# Patient Record
Sex: FEMALE
[De-identification: names, ages, dates, MRNs, and addresses within clinical notes are randomized per-mention and may not be internally consistent; named-entity substitution may affect disease eponyms.]

---

## 2017-05-05 ENCOUNTER — HOSPITAL ENCOUNTER (OUTPATIENT)
Dept: HOSPITAL 45 - C.LABMFLN | Age: 37
Discharge: HOME | End: 2017-05-05
Attending: PHYSICIAN ASSISTANT
Payer: COMMERCIAL

## 2017-05-05 DIAGNOSIS — Z02.1: Primary | ICD-10-CM

## 2017-05-05 LAB
ALBUMIN/GLOB SERPL: 0.8 {RATIO} (ref 0.9–2)
ALP SERPL-CCNC: 91 U/L (ref 45–117)
ALT SERPL-CCNC: 16 U/L (ref 12–78)
ANION GAP SERPL CALC-SCNC: 6 MMOL/L (ref 3–11)
AST SERPL-CCNC: 11 U/L (ref 15–37)
BASOPHILS # BLD: 0.01 K/UL (ref 0–0.2)
BASOPHILS NFR BLD: 0.1 %
BUN SERPL-MCNC: 13 MG/DL (ref 7–18)
BUN/CREAT SERPL: 18.4 (ref 10–20)
CALCIUM SERPL-MCNC: 9.3 MG/DL (ref 8.5–10.1)
CHLORIDE SERPL-SCNC: 106 MMOL/L (ref 98–107)
CHOLEST/HDLC SERPL: 3 {RATIO}
CO2 SERPL-SCNC: 28 MMOL/L (ref 21–32)
COMPLETE: YES
CREAT SERPL-MCNC: 0.7 MG/DL (ref 0.6–1.2)
EOSINOPHIL NFR BLD AUTO: 394 K/UL (ref 130–400)
FERRITIN SERPL-MCNC: 9.1 NG/ML (ref 8–388)
GLOBULIN SER-MCNC: 3.8 GM/DL (ref 2.5–4)
GLUCOSE SERPL-MCNC: 81 MG/DL (ref 70–99)
GLUCOSE UR QL: 66 MG/DL
HCT VFR BLD CALC: 37.3 % (ref 37–47)
IG%: 0.1 %
IMM GRANULOCYTES NFR BLD AUTO: 35.5 %
KETONES UR QL STRIP: 116 MG/DL
LYMPHOCYTES # BLD: 2.81 K/UL (ref 1.2–3.4)
MCH RBC QN AUTO: 27.5 PG (ref 25–34)
MCHC RBC AUTO-ENTMCNC: 32.7 G/DL (ref 32–36)
MCV RBC AUTO: 84.2 FL (ref 80–100)
MONOCYTES NFR BLD: 9.1 %
NEUTROPHILS # BLD AUTO: 1 %
NEUTROPHILS NFR BLD AUTO: 54.2 %
NITRITE UR QL STRIP: 78 MG/DL (ref 0–150)
PH UR: 198 MG/DL (ref 0–200)
PMV BLD AUTO: 10.1 FL (ref 7.4–10.4)
POTASSIUM SERPL-SCNC: 3.6 MMOL/L (ref 3.5–5.1)
RBC # BLD AUTO: 4.43 M/UL (ref 4.2–5.4)
RUBELLA SCREEN IGG (AT CCH): (no result)
SODIUM SERPL-SCNC: 140 MMOL/L (ref 136–145)
TIBC SERPL-MCNC: 414 MCG/DL (ref 250–450)
VERY LOW DENSITY LIPOPROT CALC: 16 MG/DL
WBC # BLD AUTO: 7.92 K/UL (ref 4.8–10.8)

## 2017-07-21 ENCOUNTER — HOSPITAL ENCOUNTER (OUTPATIENT)
Dept: HOSPITAL 45 - C.LABMFLN | Age: 37
Discharge: HOME | End: 2017-07-21
Attending: FAMILY MEDICINE
Payer: COMMERCIAL

## 2017-07-21 DIAGNOSIS — J02.9: Primary | ICD-10-CM

## 2017-09-15 ENCOUNTER — HOSPITAL ENCOUNTER (OUTPATIENT)
Dept: HOSPITAL 45 - C.LABSPEC | Age: 37
Discharge: HOME | End: 2017-09-15
Attending: OBSTETRICS & GYNECOLOGY
Payer: COMMERCIAL

## 2017-09-15 DIAGNOSIS — O09.521: Primary | ICD-10-CM

## 2017-09-15 LAB
APPEARANCE UR: CLEAR
BILIRUB UR-MCNC: (no result) MG/DL
COLOR UR: YELLOW
MANUAL MICROSCOPIC REQUIRED?: NO
NITRITE UR QL STRIP: (no result)
PH UR STRIP: 6.5 [PH] (ref 4.5–7.5)
REVIEW REQ?: NO
SP GR UR STRIP: 1.02 (ref 1–1.03)
URINE BILL WITH OR WITHOUT MIC: (no result)
UROBILINOGEN UR-MCNC: (no result) MG/DL

## 2017-09-22 ENCOUNTER — HOSPITAL ENCOUNTER (OUTPATIENT)
Dept: HOSPITAL 45 - C.LAB1850 | Age: 37
Discharge: HOME | End: 2017-09-22
Attending: OBSTETRICS & GYNECOLOGY
Payer: COMMERCIAL

## 2017-09-22 ENCOUNTER — HOSPITAL ENCOUNTER (OUTPATIENT)
Dept: HOSPITAL 45 - C.PAPS | Age: 37
Discharge: HOME | End: 2017-09-22
Attending: OBSTETRICS & GYNECOLOGY
Payer: COMMERCIAL

## 2017-09-22 ENCOUNTER — HOSPITAL ENCOUNTER (OUTPATIENT)
Dept: HOSPITAL 45 - C.LABSPEC | Age: 37
Discharge: HOME | End: 2017-09-22
Attending: OBSTETRICS & GYNECOLOGY
Payer: COMMERCIAL

## 2017-09-22 DIAGNOSIS — Z01.419: Primary | ICD-10-CM

## 2017-09-22 DIAGNOSIS — O09.521: ICD-10-CM

## 2017-09-22 DIAGNOSIS — O09.521: Primary | ICD-10-CM

## 2017-09-22 LAB
BASOPHILS # BLD: 0.02 K/UL (ref 0–0.2)
BASOPHILS NFR BLD: 0.2 %
COMPLETE: YES
EOSINOPHIL NFR BLD AUTO: 433 K/UL (ref 130–400)
HCT VFR BLD CALC: 37.3 % (ref 37–47)
IG%: 0.2 %
IMM GRANULOCYTES NFR BLD AUTO: 27.9 %
LYMPHOCYTES # BLD: 2.42 K/UL (ref 1.2–3.4)
MCH RBC QN AUTO: 27.7 PG (ref 25–34)
MCHC RBC AUTO-ENTMCNC: 33.8 G/DL (ref 32–36)
MCV RBC AUTO: 82 FL (ref 80–100)
MONOCYTES NFR BLD: 7.7 %
NEUTROPHILS # BLD AUTO: 0.6 %
NEUTROPHILS NFR BLD AUTO: 63.4 %
PMV BLD AUTO: 10.7 FL (ref 7.4–10.4)
RBC # BLD AUTO: 4.55 M/UL (ref 4.2–5.4)
WBC # BLD AUTO: 8.68 K/UL (ref 4.8–10.8)

## 2017-09-25 LAB
CHLAMYDIA TRACH RNA***: NOT DETECTED
GC (NEIS GONORRHOEAE)RNA**: NOT DETECTED

## 2017-11-17 ENCOUNTER — HOSPITAL ENCOUNTER (OUTPATIENT)
Dept: HOSPITAL 45 - C.LAB1850 | Age: 37
Discharge: HOME | End: 2017-11-17
Attending: OBSTETRICS & GYNECOLOGY
Payer: COMMERCIAL

## 2017-11-17 DIAGNOSIS — O09.522: Primary | ICD-10-CM

## 2017-11-17 LAB — GTGD: 50 GRAMS

## 2017-11-22 LAB
AFP CONCENTRATION: 22.3 NG/ML
AFP MSS INTERPRETATION: (no result)
AFP MULTIPLE OF MEDIAN: 0.91
AFPTS COMMENT: (no result)
AFPTS GESTATIONAL AGE: 16.7 WEEKS
AFPTS INSULIN DEP DIABETIC?: NO
AFPTS MATERNAL WT: 281 LBS
ALPHA-FETOPROTEIN RACE: (no result)
CIGARETTE SMOKER?: (no result)
EDD DETERMINED BY: (no result)
HISTORY OF NTD: NO
REPEAT SAMPLE?: NO

## 2018-02-09 ENCOUNTER — HOSPITAL ENCOUNTER (OUTPATIENT)
Dept: HOSPITAL 45 - C.LAB1850 | Age: 38
Discharge: HOME | End: 2018-02-09
Attending: OBSTETRICS & GYNECOLOGY
Payer: COMMERCIAL

## 2018-02-09 DIAGNOSIS — O09.523: Primary | ICD-10-CM

## 2018-02-09 LAB
HCT VFR BLD CALC: 32.6 % (ref 37–47)
HGB BLD-MCNC: 10.8 G/DL (ref 12–16)

## 2018-03-01 ENCOUNTER — HOSPITAL ENCOUNTER (EMERGENCY)
Dept: HOSPITAL 45 - C.EDB | Age: 38
Discharge: HOME | End: 2018-03-01
Payer: COMMERCIAL

## 2018-03-01 ENCOUNTER — HOSPITAL ENCOUNTER (OUTPATIENT)
Dept: HOSPITAL 45 - C.LD | Age: 38
Setting detail: OBSERVATION
LOS: 1 days | Discharge: HOME | End: 2018-03-02
Attending: OBSTETRICS & GYNECOLOGY | Admitting: OBSTETRICS & GYNECOLOGY
Payer: COMMERCIAL

## 2018-03-01 VITALS — SYSTOLIC BLOOD PRESSURE: 177 MMHG | OXYGEN SATURATION: 98 % | HEART RATE: 106 BPM | DIASTOLIC BLOOD PRESSURE: 117 MMHG

## 2018-03-01 VITALS
BODY MASS INDEX: 55.32 KG/M2 | HEIGHT: 60.98 IN | WEIGHT: 293 LBS | HEIGHT: 60.98 IN | WEIGHT: 293 LBS | BODY MASS INDEX: 55.32 KG/M2

## 2018-03-01 VITALS
BODY MASS INDEX: 55.32 KG/M2 | HEIGHT: 60.98 IN | WEIGHT: 293 LBS | BODY MASS INDEX: 55.32 KG/M2 | WEIGHT: 293 LBS | HEIGHT: 60.98 IN

## 2018-03-01 VITALS — TEMPERATURE: 98.6 F

## 2018-03-01 DIAGNOSIS — O99.213: ICD-10-CM

## 2018-03-01 DIAGNOSIS — E66.01: ICD-10-CM

## 2018-03-01 DIAGNOSIS — R19.7: ICD-10-CM

## 2018-03-01 DIAGNOSIS — O09.523: ICD-10-CM

## 2018-03-01 DIAGNOSIS — R11.2: Primary | ICD-10-CM

## 2018-03-01 DIAGNOSIS — Z3A.31: ICD-10-CM

## 2018-03-01 DIAGNOSIS — O09.523: Primary | ICD-10-CM

## 2018-03-01 DIAGNOSIS — K52.9: ICD-10-CM

## 2018-03-01 LAB
ALBUMIN SERPL-MCNC: 2.5 GM/DL (ref 3.4–5)
ALP SERPL-CCNC: 114 U/L (ref 45–117)
ALT SERPL-CCNC: 13 U/L (ref 12–78)
AST SERPL-CCNC: 15 U/L (ref 15–37)
BASOPHILS # BLD: 0 K/UL (ref 0–0.2)
BASOPHILS NFR BLD: 0 %
BUN SERPL-MCNC: 10 MG/DL (ref 7–18)
CALCIUM SERPL-MCNC: 8.9 MG/DL (ref 8.5–10.1)
CO2 SERPL-SCNC: 21 MMOL/L (ref 21–32)
CREAT SERPL-MCNC: 0.51 MG/DL (ref 0.6–1.2)
EOS ABS #: 0.01 K/UL (ref 0–0.5)
EOSINOPHIL NFR BLD AUTO: 410 K/UL (ref 130–400)
GLUCOSE SERPL-MCNC: 90 MG/DL (ref 70–99)
HCT VFR BLD CALC: 34 % (ref 37–47)
HGB BLD-MCNC: 11.4 G/DL (ref 12–16)
IG#: 0.08 K/UL (ref 0–0.02)
IMM GRANULOCYTES NFR BLD AUTO: 8.8 %
LIPASE: 128 U/L (ref 73–393)
LYMPHOCYTES # BLD: 1.53 K/UL (ref 1.2–3.4)
MCH RBC QN AUTO: 27.3 PG (ref 25–34)
MCHC RBC AUTO-ENTMCNC: 33.5 G/DL (ref 32–36)
MCV RBC AUTO: 81.5 FL (ref 80–100)
MONO ABS #: 0.99 K/UL (ref 0.11–0.59)
MONOCYTES NFR BLD: 5.7 %
NEUT ABS #: 14.7 K/UL (ref 1.4–6.5)
NEUTROPHILS # BLD AUTO: 0.1 %
NEUTROPHILS NFR BLD AUTO: 84.9 %
PMV BLD AUTO: 10 FL (ref 7.4–10.4)
POTASSIUM SERPL-SCNC: 3.2 MMOL/L (ref 3.5–5.1)
PROT SERPL-MCNC: 6.9 GM/DL (ref 6.4–8.2)
RED CELL DISTRIBUTION WIDTH CV: 13.8 % (ref 11.5–14.5)
RED CELL DISTRIBUTION WIDTH SD: 40.7 FL (ref 36.4–46.3)
SODIUM SERPL-SCNC: 137 MMOL/L (ref 136–145)
URATE SERPL-MCNC: 3.7 MG/DL (ref 2.6–7.2)
WBC # BLD AUTO: 17.31 K/UL (ref 4.8–10.8)

## 2018-03-01 RX ADMIN — SODIUM CHLORIDE AND POTASSIUM CHLORIDE SCH MLS/HR: 9; 1.49 INJECTION, SOLUTION INTRAVENOUS at 18:46

## 2018-03-01 RX ADMIN — SODIUM CHLORIDE AND POTASSIUM CHLORIDE SCH MLS/HR: 9; 1.49 INJECTION, SOLUTION INTRAVENOUS at 23:11

## 2018-03-01 NOTE — EMERGENCY ROOM VISIT NOTE
History


First contact with patient:  15:18


Chief Complaint:  VOMITING


Stated Complaint:  SUDDEN NIVID,31 WEEKS PREGNANT WITH CONTRACTIONS


Nursing Triage Summary:  


HAS BEEN NAUSEATED SINCE NOT EATING BUT WHEN SHE EATS EMESIS





History of Present Illness


The patient is a 37 year old female who presents to the Emergency Room via 

private vehicle accompanied by female with complaints of "sudden onset nausea, 

vomiting, diarrhea around 11:30 AM".  The patient states that she is 31 weeks 

pregnant and also has been experiencing contractions with this.  She states she 

also has some epigastric abdominal pain.  The gallbladder is surgically absent.

  She states that she has been trying to tolerate p.o. fluids and food without 

success.  She has had watery diarrhea.  There is been 8-9 episodes and has been 

light brown in nature.  She took Tylenol this morning around 8 or 9 AM.  She 

has associated chills, headache and sharp abdominal pain inferiorly with crampy 

sensation inferiorly that is progressing and lasting about 15 seconds in 

duration.  She denies any fevers, melena, hematochezia, hematemesis, vaginal 

bleeding, vaginal discharge, urinary symptoms.  She denies any decrease in 

fetal activity.  She has a history of ventral hernia and pernicious anemia.





Review of Systems


A complete 10-point Review of Systems was discussed with the patient, with 

pertinent positives and negatives listed in the History of Present Illness. All 

remaining Review of Systems questions can be considered negative unless 

otherwise specified.





Past Medical/Surgical History


Medical Problems:


(1) 








Current/Historical Medications


Scheduled


Cyanocobalamin (Cyanocobalamin), 1,000 MCG IM Mo


Hydroxyprogesterone Caproate ([place]), 750 MG IM WK


Iron-Vitamin C (Vitron-C), 1 TAB PO DAILY


Multivit/Min/Iron/Fol Ac/Pren (Prenatal Vitamin), 1 TAB PO DAILY





Physical Exam


Vital Signs











  Date Time  Temp Pulse Resp B/P (MAP) Pulse Ox O2 Delivery O2 Flow Rate FiO2


 


3/1/18 17:03     98 Room Air  


 


3/1/18 17:03  106 16 177/117 98 Room Air  


 


3/1/18 15:02 37.0 135 22 156/102 99   











Physical Exam


VITAL SIGNS - Vital signs and nursing notes were reviewed.  Hypertensive and 

tachycardic upon arrival.  She is afebrile.


GENERAL -37-year-old female appearing her stated age who is in no acute 

distress. Communicates well with provider and answers questions appropriately.


SKIN - Without rashes.  No petechial rashes.


HEAD - NC/AT.


EYES - Sclera anicteric. 


EARS - No deformities of external structures noted on gross examination 

bilaterally. 


NOSE - Midline and without cyanosis. No epistaxis or purulent drainage noted. 


MOUTH/OROPHARYNX - Without perioral cyanosis. 


NECK - Neck with FROM. 


LUNGS - Chest wall symmetric without accessory muscle use, intercostals 

retractions, or central cyanosis. Normal vesicular breath sounds CTA B/L. No 

wheezes, rales, or rhonchi appreciated.


CARDIAC - RRR with S1/S2. No murmur, rubs, or gallops appreciated. 


ABDOMEN - Abdominal contour with evidence of gestation without pulsations or 

visible masses. BS normoactive all four quadrants. Epigastric abd pain noted.No 

unusual palpable masses, hepatosplenomegaly, or ascites noted.


EXTREMITIES - No clubbing or peripheral cyanosis. No pretibial edema present.


NEUROLOGIC - Cranial nerves II through XII grossly intact.





Medical Decision & Procedures


Laboratory Results


3/1/18 15:55








Red Blood Count 4.17, Mean Corpuscular Volume 81.5, Mean Corpuscular Hemoglobin 

27.3, Mean Corpuscular Hemoglobin Concent 33.5, Mean Platelet Volume 10.0, 

Neutrophils (%) (Auto) 84.9, Lymphocytes (%) (Auto) 8.8, Monocytes (%) (Auto) 

5.7, Eosinophils (%) (Auto) 0.1, Basophils (%) (Auto) 0.0, Neutrophils # (Auto) 

14.70, Lymphocytes # (Auto) 1.53, Monocytes # (Auto) 0.99, Eosinophils # (Auto) 

0.01, Basophils # (Auto) 0.00





3/1/18 15:55

















Test


  3/1/18


15:55 3/1/18


17:20


 


White Blood Count


  17.31 K/uL


(4.8-10.8) 


 


 


Red Blood Count


  4.17 M/uL


(4.2-5.4) 


 


 


Hemoglobin


  11.4 g/dL


(12.0-16.0) 


 


 


Hematocrit 34.0 % (37-47)  


 


Mean Corpuscular Volume


  81.5 fL


() 


 


 


Mean Corpuscular Hemoglobin


  27.3 pg


(25-34) 


 


 


Mean Corpuscular Hemoglobin


Concent 33.5 g/dl


(32-36) 


 


 


Platelet Count


  410 K/uL


(130-400) 


 


 


Mean Platelet Volume


  10.0 fL


(7.4-10.4) 


 


 


Neutrophils (%) (Auto) 84.9 %  


 


Lymphocytes (%) (Auto) 8.8 %  


 


Monocytes (%) (Auto) 5.7 %  


 


Eosinophils (%) (Auto) 0.1 %  


 


Basophils (%) (Auto) 0.0 %  


 


Neutrophils # (Auto)


  14.70 K/uL


(1.4-6.5) 


 


 


Lymphocytes # (Auto)


  1.53 K/uL


(1.2-3.4) 


 


 


Monocytes # (Auto)


  0.99 K/uL


(0.11-0.59) 


 


 


Eosinophils # (Auto)


  0.01 K/uL


(0-0.5) 


 


 


Basophils # (Auto)


  0.00 K/uL


(0-0.2) 


 


 


RDW Standard Deviation


  40.7 fL


(36.4-46.3) 


 


 


RDW Coefficient of Variation


  13.8 %


(11.5-14.5) 


 


 


Immature Granulocyte % (Auto) 0.5 %  


 


Immature Granulocyte # (Auto)


  0.08 K/uL


(0.00-0.02) 


 


 


Anion Gap


  10.0 mmol/L


(3-11) 


 


 


Est Creatinine Clear Calc


Drug Dose 197.1 ml/min 


  


 


 


Estimated GFR (


American) 142.4 


  


 


 


Estimated GFR (Non-


American 122.8 


  


 


 


BUN/Creatinine Ratio 19.9 (10-20)  


 


Uric Acid


  3.7 mg/dl


(2.6-7.2) 


 


 


Calcium Level


  8.9 mg/dl


(8.5-10.1) 


 


 


Magnesium Level


  1.8 mg/dl


(1.8-2.4) 


 


 


Total Bilirubin


  0.3 mg/dl


(0.2-1) 


 


 


Aspartate Amino Transf


(AST/SGOT) 15 U/L (15-37) 


  


 


 


Alanine Aminotransferase


(ALT/SGPT) 13 U/L (12-78) 


  


 


 


Alkaline Phosphatase


  114 U/L


() 


 


 


Total Protein


  6.9 gm/dl


(6.4-8.2) 


 


 


Albumin


  2.5 gm/dl


(3.4-5.0) 


 


 


Globulin


  4.4 gm/dl


(2.5-4.0) 


 


 


Albumin/Globulin Ratio 0.6 (0.9-2)  


 


Lipase


  128 U/L


() 


 


 


Thyroid Stimulating Hormone


(TSH) 1.050 uIu/ml


(0.300-4.500) 


 














 Date/Time


Source Procedure


Growth Status


 


 


 3/1/18 16:10


Stool C.difficile Toxin B Gene (PCR) - Final


No C. difficile toxin B gene detected Complete











Medications Administered











 Medications


  (Trade)  Dose


 Ordered  Sig/Heather


 Route  Start Time


 Stop Time Status Last Admin


Dose Admin


 


 Sodium Chloride  1,000 ml @ 


 999 mls/hr  Q1H1M STAT


 IV  3/1/18 15:41


 3/1/18 16:41 DC 3/1/18 15:41


999 MLS/HR











Medical Decision


Patient was seen and evaluated as above.  She presents to us today with nausea, 

vomiting and diarrhea with abrupt onset around 11:30 AM with associated 

epigastric abdominal pain and lower pelvic contractions.  She declines pain 

medication and nausea medication.  She was given fluids.  After obtaining a 

thorough history and physical examination the above work up was performed.  

Leukocytosis of 17.31.  No emergent anemia.  Metabolic panel does not reveal 

any evidence of kidney or liver failure.  Platelet count high at 410.  

Potassium 3.2.  Creatinine at 0.51.  TSH and lipase normal.  Urine reveals 1+ 

ketones and epithelial cells.  From emergency department standpoint I suspect 

that she is likely experiencing a viral GI bug.  I did discuss the case with 

the on-call OB/GYN who accepted the patient to the labor and delivery for 

further evaluation for her contractions.  After nearly 1 L of fluid here the 

patient's tachycardia improved.  Her hypertension did persist.  Uric acid 

negative.  She was discharged from the emergency department with escort to take 

her to the floor for continuation of fluids and management.





In the evaluation and treatment of this patient the following differential 

diagnoses were entertained: Viral GI illness, pancreatitis, active labor, 

appendicitis, preeclampsia, UTI, among others.





Impression





 Primary Impression:  


 Nausea, vomiting, and diarrhea


 Additional Impression:  


 31 weeks gestation of pregnancy





Departure Information


Dispostion


Home / Self-Care





Condition


GOOD





Referrals


Meg Seaman M.D. (PCP)





Patient Instructions


My [facility]





Additional Instructions





You were seen in the emergency department for epigastric abdominal pain, nausea

, vomiting, diarrhea and contractions.





At this time you will be discharged from the emergency department to go to the 

labor and delivery floor for evaluation by 1 of the obstetricians.





I do recommend going directly there.  Please return with any new/concerning 

symptoms.





Thank you for your time.





Problem Qualifiers

## 2018-03-02 NOTE — PHARMACY PROGRESS NOTE
ED Pharmacist Culture FollowUp


Date of Service:


Mar 2, 2018.


Received notification from micro that patient's stool cx is growing 

campylobacter species.





Patient had presented with NVD yesterday as well as contractions (she is 31 

weeks pregnant).  Patient was not discharged to home from the ED rather she was 

admitted to L[facility].





I contacted [facility] regarding these results.  The patient had been hydrated and 

discharged home today.  The RN will notify Dr Romeo of these cx stool results.





No further action required from ED standpoint.

## 2018-03-02 NOTE — DISCHARGE INSTRUCTIONS
Discharge Instructions


Date of Service


Mar 2, 2018.





Admission


Gastroenteritis, Uterine contractions





Discharge


Discharge Diagnosis / Problem:  Gastroenteritis, resolution of contractions





Discharge Goals


Goal(s):  Continuing OB care





Activity Recommendations


Activity Limitations:  resume your previous activity





.





Instructions / Follow-Up


Instructions / Follow-Up


Continue to sip diluted gatorade until diarrhea resolves.  Monitor fetal 

movements and notify OB if unable to feel 10 movements in an hour.  Notify OB 

if contractions return and become closer than 5 minutes apart.





Current Hospital Diet


Patient's current hospital diet: Regular Diet





Discharge Diet


Recommended Diet:  Regular Diet





Pending Studies


Studies pending at discharge:  no





Medical Emergencies








.


Who to Call and When:





Medical Emergencies:  If at any time you feel your situation is an emergency, 

please call 911 immediately.





.





Non-Emergent Contact


Non-Emergency issues call your:  Primary Care Provider





.


.








"Provider Documentation" section prepared by Cande Terry.








.

## 2018-03-08 ENCOUNTER — HOSPITAL ENCOUNTER (OUTPATIENT)
Dept: HOSPITAL 45 - C.LABSPEC | Age: 38
Discharge: HOME | End: 2018-03-08
Attending: OBSTETRICS & GYNECOLOGY
Payer: COMMERCIAL

## 2018-03-08 DIAGNOSIS — O47.9: Primary | ICD-10-CM

## 2018-04-02 ENCOUNTER — HOSPITAL ENCOUNTER (OUTPATIENT)
Dept: HOSPITAL 45 - C.LABSPEC | Age: 38
Discharge: HOME | End: 2018-04-02
Attending: OBSTETRICS & GYNECOLOGY
Payer: COMMERCIAL

## 2018-04-02 DIAGNOSIS — O09.523: Primary | ICD-10-CM

## 2018-04-09 ENCOUNTER — HOSPITAL ENCOUNTER (OUTPATIENT)
Dept: HOSPITAL 45 - C.OPB | Age: 38
LOS: 1 days | Discharge: HOME | End: 2018-04-10
Attending: OBSTETRICS & GYNECOLOGY
Payer: COMMERCIAL

## 2018-04-09 VITALS
WEIGHT: 293 LBS | HEIGHT: 60.98 IN | BODY MASS INDEX: 55.32 KG/M2 | BODY MASS INDEX: 55.32 KG/M2 | WEIGHT: 293 LBS | HEIGHT: 60.98 IN

## 2018-04-09 DIAGNOSIS — Z34.83: Primary | ICD-10-CM

## 2018-04-09 DIAGNOSIS — Z3A.37: ICD-10-CM

## 2018-04-19 ENCOUNTER — HOSPITAL ENCOUNTER (INPATIENT)
Dept: HOSPITAL 45 - C.OPB | Age: 38
LOS: 3 days | Discharge: HOME | End: 2018-04-22
Attending: OBSTETRICS & GYNECOLOGY | Admitting: OBSTETRICS & GYNECOLOGY
Payer: COMMERCIAL

## 2018-04-19 VITALS
BODY MASS INDEX: 54.11 KG/M2 | HEIGHT: 60.98 IN | BODY MASS INDEX: 54.11 KG/M2 | WEIGHT: 286.6 LBS | WEIGHT: 286.6 LBS | HEIGHT: 60.98 IN

## 2018-04-19 DIAGNOSIS — O09.523: ICD-10-CM

## 2018-04-19 DIAGNOSIS — O34.219: Primary | ICD-10-CM

## 2018-04-19 DIAGNOSIS — Z3A.38: ICD-10-CM

## 2018-04-19 DIAGNOSIS — Z88.0: ICD-10-CM

## 2018-04-19 DIAGNOSIS — Z22.330: ICD-10-CM

## 2018-04-19 LAB
EOSINOPHIL NFR BLD AUTO: 420 K/UL (ref 130–400)
HCT VFR BLD CALC: 35.4 % (ref 37–47)
HGB BLD-MCNC: 11.5 G/DL (ref 12–16)
MCH RBC QN AUTO: 25.6 PG (ref 25–34)
MCHC RBC AUTO-ENTMCNC: 32.5 G/DL (ref 32–36)
MCV RBC AUTO: 78.7 FL (ref 80–100)
PMV BLD AUTO: 10.4 FL (ref 7.4–10.4)
RED CELL DISTRIBUTION WIDTH CV: 14.3 % (ref 11.5–14.5)
RED CELL DISTRIBUTION WIDTH SD: 40.2 FL (ref 36.4–46.3)
WBC # BLD AUTO: 12.96 K/UL (ref 4.8–10.8)

## 2018-04-19 RX ADMIN — SODIUM CHLORIDE, SODIUM LACTATE, POTASSIUM CHLORIDE, AND CALCIUM CHLORIDE PRN MLS/HR: 600; 310; 30; 20 INJECTION, SOLUTION INTRAVENOUS at 22:30

## 2018-04-19 RX ADMIN — SODIUM CHLORIDE, SODIUM LACTATE, POTASSIUM CHLORIDE, AND CALCIUM CHLORIDE PRN MLS/HR: 600; 310; 30; 20 INJECTION, SOLUTION INTRAVENOUS at 23:30

## 2018-04-20 VITALS — HEART RATE: 88 BPM | DIASTOLIC BLOOD PRESSURE: 83 MMHG | TEMPERATURE: 97.7 F | SYSTOLIC BLOOD PRESSURE: 139 MMHG

## 2018-04-20 VITALS — HEART RATE: 87 BPM | DIASTOLIC BLOOD PRESSURE: 84 MMHG | SYSTOLIC BLOOD PRESSURE: 131 MMHG | TEMPERATURE: 98.06 F

## 2018-04-20 VITALS
DIASTOLIC BLOOD PRESSURE: 75 MMHG | TEMPERATURE: 97.88 F | OXYGEN SATURATION: 96 % | HEART RATE: 88 BPM | SYSTOLIC BLOOD PRESSURE: 139 MMHG

## 2018-04-20 VITALS
OXYGEN SATURATION: 97 % | HEART RATE: 94 BPM | DIASTOLIC BLOOD PRESSURE: 79 MMHG | TEMPERATURE: 98.06 F | SYSTOLIC BLOOD PRESSURE: 153 MMHG

## 2018-04-20 VITALS — DIASTOLIC BLOOD PRESSURE: 88 MMHG | HEART RATE: 86 BPM | TEMPERATURE: 98.42 F | SYSTOLIC BLOOD PRESSURE: 139 MMHG

## 2018-04-20 PROCEDURE — 0KQM0ZZ REPAIR PERINEUM MUSCLE, OPEN APPROACH: ICD-10-PCS | Performed by: OBSTETRICS & GYNECOLOGY

## 2018-04-20 RX ADMIN — FERROUS SULFATE TAB EC 325 MG (65 MG FE EQUIVALENT) SCH MG: 325 (65 FE) TABLET DELAYED RESPONSE at 08:00

## 2018-04-20 RX ADMIN — Medication SCH TAB: at 08:00

## 2018-04-20 RX ADMIN — DOCUSATE SODIUM SCH MG: 100 CAPSULE, LIQUID FILLED ORAL at 08:00

## 2018-04-20 RX ADMIN — DOCUSATE SODIUM SCH MG: 100 CAPSULE, LIQUID FILLED ORAL at 20:58

## 2018-04-20 RX ADMIN — ACETAMINOPHEN PRN MG: 325 TABLET ORAL at 20:59

## 2018-04-20 RX ADMIN — ACETAMINOPHEN PRN MG: 325 TABLET ORAL at 12:51

## 2018-04-20 NOTE — DELIVERY SUMMARY
DATE OF OPERATION:  2018

 

FINDINGS:  Viable female infant with Apgars of 8 and 9.  Baby delivered

spontaneously over a midline second-degree laceration.  Cord blood samples

obtained.  Placenta delivered spontaneously.  Laceration repaired with 4-0

Vicryl in routine fashion.

 

ESTIMATED BLOOD LOSS:  300 mL.

 

LABOR NOTE:  The patient is a 38-year-old  4, para 3 with an EDC of

2018 by first trimester ultrasound who was admitted at 38+ weeks

gestational age in active labor.  The patient states that contractions began

in intensity at approximately 1830 hours on day of admission.  The patient

denied rupture of membranes or vaginal bleeding.  The patient with a previous

history of a successful  in .  She had a  section in  for

failure to progress.  Her initial delivery was a vaginal delivery in . 

The patient has been followed with the advanced maternal age protocol with

normal third trimester testing.  Laboratory values for the pregnancy show a

blood type of A positive, antibody negative, rubella immune, hepatitis B

negative.  She had a negative cell-free DNA screening with a normal 1-hour

Glucola x2 and a positive third trimester beta strep culture.  Upon

admission, the patient was 5 cm dilated, 100% effaced, and -2 station. 

Tracing was category 1.  The patient with GBS positive and PENICILLIN ALLERGY

WITH HIVES.  We discussed this and we treated this with cephalosporin, Ancef

2 g IV loading dose and then 1 g IV every 6 hours until delivery.  The

patient requested and received an epidural from anesthesia.  Following

placement of the epidural, she had artificial rupture of membranes for clear

fluid at 7 cm.  Three hours later, the patient with an anterior lip,

contractions were mild, and Pitocin augmentation was initiated.  After

initiation of the Pitocin, she progressed to full dilatation and began her

second stage.  She pushed for approximately 20 minutes delivering the viable

female infant.  Cord was clamped and cut.  Cord blood sample was obtained. 

Placenta delivered spontaneously.  Inspection of the perineum showed a

midline second-degree laceration.  Laceration was repaired with 4-0 Vicryl in

routine fashion.  Estimated blood loss 300 mL.

 

 

I attest to the content of the Intraoperative Record and any orders documented therein. Any exception
s are noted below.

## 2018-04-20 NOTE — ANESTHESIA PROCEDURE NOTE
Anesthesia Epidural Removal Nt


Date & Time


Apr 20, 2018 at 16:30





Vital Signs


Pain Intensity:  3.0





Vital Signs Past 12 Hours








  Date Time  Temp Pulse Resp B/P (MAP) Pulse Ox O2 Delivery O2 Flow Rate FiO2


 


4/20/18 12:15 36.7 94 20 153/79 (103) 97 Room Air  


 


4/20/18 10:25     96 Room Air  


 


4/20/18 10:25 36.6 88 18 139/75 (96) 96 Room Air  











Notes


Mental Status:  alert / awake / arousable, participated in evaluation


Nausea / Vomiting:  adequately controlled


Pain:  adequately controlled


Airway Patency, RR, SpO2:  stable & adequate


BP & HR:  stable & adequate


Hydration State:  stable & adequate


Neuraxial Anesthesia:  was administered, sensory block is resolving


Anesthetic Complications:  no major complications apparent, pt satisfied with 

anesthetic care


Epidural:  removed without complications, with tip intact

## 2018-04-21 VITALS — TEMPERATURE: 98.24 F | DIASTOLIC BLOOD PRESSURE: 90 MMHG | HEART RATE: 83 BPM | SYSTOLIC BLOOD PRESSURE: 143 MMHG

## 2018-04-21 VITALS — HEART RATE: 76 BPM | TEMPERATURE: 97.88 F | DIASTOLIC BLOOD PRESSURE: 84 MMHG | SYSTOLIC BLOOD PRESSURE: 132 MMHG

## 2018-04-21 VITALS
SYSTOLIC BLOOD PRESSURE: 133 MMHG | TEMPERATURE: 98.06 F | OXYGEN SATURATION: 97 % | DIASTOLIC BLOOD PRESSURE: 83 MMHG | HEART RATE: 83 BPM

## 2018-04-21 VITALS — SYSTOLIC BLOOD PRESSURE: 135 MMHG | DIASTOLIC BLOOD PRESSURE: 85 MMHG | TEMPERATURE: 98.06 F | HEART RATE: 92 BPM

## 2018-04-21 LAB
HCT VFR BLD CALC: 31.5 % (ref 37–47)
HGB BLD-MCNC: 9.9 G/DL (ref 12–16)

## 2018-04-21 RX ADMIN — FERROUS SULFATE TAB EC 325 MG (65 MG FE EQUIVALENT) SCH MG: 325 (65 FE) TABLET DELAYED RESPONSE at 08:16

## 2018-04-21 RX ADMIN — Medication SCH TAB: at 08:16

## 2018-04-21 RX ADMIN — OXYCODONE HYDROCHLORIDE AND ACETAMINOPHEN PRN TAB: 5; 325 TABLET ORAL at 22:45

## 2018-04-21 RX ADMIN — OXYCODONE HYDROCHLORIDE AND ACETAMINOPHEN PRN TAB: 5; 325 TABLET ORAL at 15:34

## 2018-04-21 RX ADMIN — DOCUSATE SODIUM SCH MG: 100 CAPSULE, LIQUID FILLED ORAL at 08:16

## 2018-04-21 RX ADMIN — ACETAMINOPHEN PRN MG: 325 TABLET ORAL at 04:45

## 2018-04-21 RX ADMIN — DOCUSATE SODIUM SCH MG: 100 CAPSULE, LIQUID FILLED ORAL at 20:03

## 2018-04-21 RX ADMIN — ACETAMINOPHEN PRN MG: 325 TABLET ORAL at 08:35

## 2018-04-21 NOTE — DISCHARGE INSTRUCTIONS
Discharge Instructions


Date of Service


Apr 21, 2018.





Admission


Reason for Admission:  Check Labor





Discharge


Discharge Diagnosis / Problem:  vaginal delivery





Discharge Goals


Goal(s):  Routine recovery after delivery





Medications


Continue Dispensed Medications:  supercream, dermaplast, tucks, lansinoh





Activity Recommendations


Activity Limitations:  per Instructions/Follow-up section





.





Instructions / Follow-Up


Instructions / Follow-Up





ACTIVITY RECOMMENDATIONS:





* Gradual return to full activity over the next 2-3 weeks.


* No lifting - nothing heavier than baby over the next 2-3 weeks.


* Do not engage in vigorous exercise, sexual activity or sports until cleared by


   your physician.


* Do not drive or operate any motorized equipment until cleared by your 

physician.


* You may shower/bathe daily.








MEDICATIONS:





For discomfort or pain, you may use Acetaminophen (Tylenol), Ibuprofen (Advil),


or Naproxen (Aleve) following the package directions. For constipation you may 


use Colace following the package directions.








BREAST CARE:





If you are not breast feeding:





*  Wear a supportive bra 24 hours a day for one to two weeks.


*  Avoid stimulating your breasts and nipples as much as possible during the 

first 


    few weeks after delivery.


*  When taking a shower, have the warm water hit your back, not breasts.


*  When your breasts feel full, apply ice packs.  Usually three to four times a 

day


    helps ease the discomfort.


*  Take a mild pain medication (Tylenol / Motrin) when you are uncomfortable.





If breast feeding:





*  Use breast milk to lubricate nipples.  Lansinoh cream may be used for sore 

nipples. 


    You do not need to remove cream prior to breast feeding.  If using a 

different brand


   of cream, check the label for directions regarding removal of cream prior to 

nursing.


*  Wear a supportive bra.


*  If having problems with breasts or breast feeding, call a lactation 

consultant 


    or your health care provider.








EPISIOTOMY CARE:





After delivery, if you have an episiotomy (stitches), the following steps will 

ease


discomfort and aid healing.





*  For the first 24 hours after delivery, place ice packs next to your 

episiotomy to


   help reduce swelling.


*  After the first 24 hour-period, sitz baths, either portable or in the tub, 

are suggested.


    A shower with a shower arm sprayed over the episiotomy may be comforting.


*  Mar care should be done after each voiding and bowel movement.  Squirt warm 

water


    from a plastic bottle over the perineum (region of the body between the 

anus and 


    urinary opening) and pat dry.


*  Use Dermoplast to ease discomfort.  Shake container.  Spray directly over 

the 


    episiotomy.  Place a Tucks on a clean sanitary pad next to your episiotomy.








SPECIAL CARE INSTRUCTIONS:





When you are discharged from the hospital, it is important for you to follow 

the instructions 


listed below:





*  During the first week at home, you should be able to care for yourself and 

your baby.


    In addition, the usual light household activities are encouraged.





*  Limit your activities to the way you feel.  Do not try to clean the house or 

move 


    furniture. Be sensible.





*  If you actively engage in sports and have done so up until the time of your 

delivery, 


    you may resume these activities as soon as you feel able.  This may take up 

to one 


    month or even longer.  Use good judgment.





*  Continue to take your prenatal vitamins for at least six weeks after the 

birth of your baby.





*  Your diet need not be limited unless you were on a special diet before your 

delivery.  


    Breast-feeding mothers need around 2500 calories per day and at least 64-80 

ounces of 


    fluid per day (8 to 10 glasses).





*  You should eat foods from the four major food groups.  Crash diets or fad 

diets are to be 


    avoided.  Eating lean meats, fresh fruits and vegetables, low-fat dairy 

products, high fiber


    foods and a regular exercise program, will help you get back to your pre-

pregnancy weight


    without putting your health at risk.





*  Constipation is sometimes a problem after delivery.  Take a mild laxative as 

needed.  If 


    breast feeding, Milk of Magnesia is acceptable to use. You may use a 

suppository or Fleets


    enema if no episiotomy.





*  A daily shower or tub bath is suggested.  Be sure to thoroughly and gently 

dry the perineum.





*  A bloody vaginal discharge will usually continue until around four weeks 

post partum.  A 


    small amount of bleeding may continue for as long as six weeks.  Vaginal 

discharge changes


    from the bright red bleeding after delivery to pink then brownish and 

finally yellowish-pink 


    before becoming white and disappearing.





*  Bleeding may increase with activity.  Your first period may come in 4-8 

weeks.  If you are 


    breast feeding, your period may be delayed even longer.





*  [place] (sex) can begin whenever both you and your partner feel 

comfortable and do 


    not have any form of genital infection.  It is recommended that you wait at 

least six weeks


    for internal and external healing to occur.  If you have questions, please 

talk to your health


    care practitioner.  A condom should be used to prevent infection and 

pregnancy.





*  Foreplay, gentle intercourse and lubrication is very important the first 

several times to 


    prevent pain.  A water-based lubricant such as K-Y jelly or Astroglide may 

be used.





*  If you have RH negative blood and your baby is RH positive, you will receive 

RHOGAM by 


    injection prior to discharge.  The nurse will give you a card to keep with 

you that has the


    date and place that you received RHOGAM after delivery.





*  During your prenatal care, you had a Rubella screen done to check for the 

presence of 


    rubella antibodies in your blood.  If your test was negative, you will 

receive a Rubella 


    vaccine prior to discharge.  This vaccine may cause a fever, soreness at 

the injection site


    and flu-like symptoms.  If these symptoms persist, notify your health care 

practitioner.  


    Pregnancy is not advised for one month after a Rubella vaccine.





*  Verbalizes understanding of car seat law as reviewed with patient nursing.





*  Car Seat hand-out given and reviewed with patient by nursing.





*  Shaken baby information reviewed with patient by nursing.


 


Call you doctor if:





*  Heavy bleeding (saturating several pads an hour) or passing clots the size 

of your fist.


*  A fever >101 degrees F (38.3 degrees C) on two occasions four hours apart and

/or chills.


*  Unusual pain in the pelvic or vaginal areas.


* "Baby Blues" lasting longer than two weeks.





If you have any questions or concerns, call your health care practitioner at 


[phone].








FOLLOW UP VISIT:





*  Please call the office at [phone] to schedule a 6 week postpartum 


    examination.  It is important you keep this appointment.  It is important 


    for you to make arrangements for either yearly or twice yearly check-ups 


    thereafter.





Current Hospital Diet


Patient's current hospital diet: Regular OB Diet





Discharge Diet


Recommended Diet:  Regular Diet, Regular OB Diet





Pending Studies


Studies pending at discharge:  no





Medical Emergencies








.


Who to Call and When:





Medical Emergencies:  If at any time you feel your situation is an emergency, 

please call 911 immediately.





.





Non-Emergent Contact


Non-Emergency issues call your:  Primary Care Provider, Gynecologist





.


.








"Provider Documentation" section prepared by Roly Torres.








.

## 2018-04-21 NOTE — PROGRESS NOTE
Subjective


2018.


Subjective


conversation w/ patient, physical exam


Ambulation:  ambulating normally


Voiding:  no voiding problems


Diet Tolerance:  Regular Diet


Lochia:  Small


Feeding Type:  Breast Feeding


Pain:  denied any pain issues





Review of Systems


Constitutional:  No fever, No chills


Respiratory:  No cough, No shortness of breath


Cardiac:  No chest pain, No palpitations


Abdomen:  No pain, No nausea, No vomiting


Female :  No dysuria





Objective


Vital Signs











  Date Time  Temp Pulse Resp B/P (MAP) Pulse Ox O2 Delivery O2 Flow Rate FiO2


 


18 04:30 36.6 76 16 132/84 (100)  Room Air  


 


18 23:35 36.7 87 18 131/84 (100)  Room Air  


 


18 23:35      Room Air  


 


18 20:45 36.9 86 18 139/88 (105)  Room Air  


 


18 15:30      Room Air  


 


18 15:30 36.5 88 18 139/83 (101)  Room Air  


 


18 12:15 36.7 94 20 153/79 (103) 97 Room Air  


 


18 10:25     96 Room Air  


 


18 10:25 36.6 88 18 139/75 (96) 96 Room Air  











Physical Exam


General Appearance:  WELL-APPEARING, WD/WN, NO APPARENT DISTRESS


Respiratory/Chest:  lungs clear, no respiratory distress


Cardiovascular:  regular rate, rhythm, no murmur


Abdomen:  non tender, soft


Fundus:  Firm, Relation to Umbilicus (at the level of the u)


Extremities:  non-tender, normal inspection





Laboratory Results





Last 24 Hours








Test


  18


06:57











Assessment and Plan


Problem List


Medical Problems:


(1) 31 weeks gestation of pregnancy


Status: Acute  





(2) Nausea, vomiting, and diarrhea


Status: Acute  








Post-Partum (1)


Day#:  1


Continue Routine Care:


38, F  GBS+/RI/A+, h/o PIH, PPD1 


Vital reviewed; -140/75-80, BP max 153/80. Hgb 12.96 on admission-->

pending today. Patient is doing well clinically





1. Recovery from vaginal delivery; ambulate, support bf, monitor lochia, 

control pain 


2. Discussed dc planning  





Resident Physician Supervision Note:





I interviewed and examined the patient. Discussed with Dr. Torres and agree 

with findings and plan as documented in the note. Any exceptions or 

clarifications are listed here: Doing well. will watch bps. denies any needs. 





Documented By:  Alyssa Vanegas

## 2018-04-22 VITALS — DIASTOLIC BLOOD PRESSURE: 78 MMHG | HEART RATE: 106 BPM | TEMPERATURE: 97.88 F | SYSTOLIC BLOOD PRESSURE: 136 MMHG

## 2018-04-22 VITALS — TEMPERATURE: 97.88 F | DIASTOLIC BLOOD PRESSURE: 78 MMHG | HEART RATE: 106 BPM

## 2018-04-22 RX ADMIN — DOCUSATE SODIUM SCH MG: 100 CAPSULE, LIQUID FILLED ORAL at 07:58

## 2018-04-22 RX ADMIN — ACETAMINOPHEN PRN MG: 325 TABLET ORAL at 08:10

## 2018-04-22 RX ADMIN — FERROUS SULFATE TAB EC 325 MG (65 MG FE EQUIVALENT) SCH MG: 325 (65 FE) TABLET DELAYED RESPONSE at 07:59

## 2018-04-22 RX ADMIN — Medication SCH TAB: at 07:59

## 2018-04-22 NOTE — PROGRESS NOTE
Subjective


Apr 22, 2018.


Subjective


conversation w/ patient, physical exam, lab review


Ambulation:  ambulating normally


Voiding:  no voiding problems


Diet Tolerance:  Regular Diet


Lochia:  Small





Objective


Vital Signs











  Date Time  Temp Pulse Resp B/P (MAP) Pulse Ox O2 Delivery O2 Flow Rate FiO2


 


4/21/18 23:00     97 Room Air  


 


4/21/18 23:00 36.7 83 20 133/83 (100) 97 Room Air  


 


4/21/18 15:40      Room Air  


 


4/21/18 15:40 36.8 83 20 143/90 (107)  Room Air  


 


4/21/18 08:20      Room Air  


 


4/21/18 08:20 36.7 92 16 135/85 (102)  Room Air  











Physical Exam


General Appearance:  WELL-APPEARING


Abdomen:  non tender


Fundus:  Firm


Extremities:  no calf tenderness





Assessment and Plan


Problem List


Medical Problems:


(1) 31 weeks gestation of pregnancy


Status: Acute  





(2) Nausea, vomiting, and diarrhea


Status: Acute  








Post-Partum (1)


Day#:  2


Continue Routine Care:


Patient doing well meets discharge criteria instructions reviewed

## 2021-03-20 ENCOUNTER — HOSPITAL ENCOUNTER (EMERGENCY)
Age: 41
Discharge: HOME | End: 2021-03-20
Attending: EMERGENCY MEDICINE
Payer: COMMERCIAL

## 2021-03-20 VITALS
RESPIRATION RATE: 16 BRPM | SYSTOLIC BLOOD PRESSURE: 145 MMHG | TEMPERATURE: 98.3 F | OXYGEN SATURATION: 100 % | HEIGHT: 61 IN | WEIGHT: 245 LBS | BODY MASS INDEX: 46.26 KG/M2 | HEART RATE: 106 BPM | DIASTOLIC BLOOD PRESSURE: 81 MMHG

## 2021-03-20 DIAGNOSIS — O03.9 MISCARRIAGE: Primary | ICD-10-CM

## 2021-03-20 LAB
ABO/RH: NORMAL
ABSOLUTE EOS #: 0.14 K/UL (ref 0–0.44)
ABSOLUTE IMMATURE GRANULOCYTE: 0.03 K/UL (ref 0–0.3)
ABSOLUTE LYMPH #: 2.62 K/UL (ref 1.1–3.7)
ABSOLUTE MONO #: 0.71 K/UL (ref 0.1–1.2)
ANION GAP SERPL CALCULATED.3IONS-SCNC: 13 MMOL/L (ref 9–17)
BASOPHILS # BLD: 1 % (ref 0–2)
BASOPHILS ABSOLUTE: 0.05 K/UL (ref 0–0.2)
BUN BLDV-MCNC: 12 MG/DL (ref 6–20)
BUN/CREAT BLD: 21 (ref 9–20)
CALCIUM SERPL-MCNC: 9.1 MG/DL (ref 8.6–10.4)
CHLORIDE BLD-SCNC: 104 MMOL/L (ref 98–107)
CO2: 21 MMOL/L (ref 20–31)
CREAT SERPL-MCNC: 0.57 MG/DL (ref 0.5–0.9)
DIFFERENTIAL TYPE: NORMAL
EOSINOPHILS RELATIVE PERCENT: 2 % (ref 1–4)
GFR AFRICAN AMERICAN: >60 ML/MIN
GFR NON-AFRICAN AMERICAN: >60 ML/MIN
GFR SERPL CREATININE-BSD FRML MDRD: ABNORMAL ML/MIN/{1.73_M2}
GFR SERPL CREATININE-BSD FRML MDRD: ABNORMAL ML/MIN/{1.73_M2}
GLUCOSE BLD-MCNC: 95 MG/DL (ref 70–99)
HCG QUANTITATIVE: 71 IU/L
HCT VFR BLD CALC: 41.4 % (ref 36.3–47.1)
HEMOGLOBIN: 13.4 G/DL (ref 11.9–15.1)
IMMATURE GRANULOCYTES: 0 %
LYMPHOCYTES # BLD: 29 % (ref 24–43)
MCH RBC QN AUTO: 29.6 PG (ref 25.2–33.5)
MCHC RBC AUTO-ENTMCNC: 32.4 G/DL (ref 28.4–34.8)
MCV RBC AUTO: 91.4 FL (ref 82.6–102.9)
MONOCYTES # BLD: 8 % (ref 3–12)
NRBC AUTOMATED: 0 PER 100 WBC
PDW BLD-RTO: 12.6 % (ref 11.8–14.4)
PLATELET # BLD: 407 K/UL (ref 138–453)
PLATELET ESTIMATE: NORMAL
PMV BLD AUTO: 9.6 FL (ref 8.1–13.5)
POTASSIUM SERPL-SCNC: 3.7 MMOL/L (ref 3.7–5.3)
RBC # BLD: 4.53 M/UL (ref 3.95–5.11)
RBC # BLD: NORMAL 10*6/UL
SEG NEUTROPHILS: 60 % (ref 36–65)
SEGMENTED NEUTROPHILS ABSOLUTE COUNT: 5.37 K/UL (ref 1.5–8.1)
SODIUM BLD-SCNC: 138 MMOL/L (ref 135–144)
WBC # BLD: 8.9 K/UL (ref 3.5–11.3)
WBC # BLD: NORMAL 10*3/UL

## 2021-03-20 PROCEDURE — 86900 BLOOD TYPING SEROLOGIC ABO: CPT

## 2021-03-20 PROCEDURE — 86901 BLOOD TYPING SEROLOGIC RH(D): CPT

## 2021-03-20 PROCEDURE — 81025 URINE PREGNANCY TEST: CPT

## 2021-03-20 PROCEDURE — 99283 EMERGENCY DEPT VISIT LOW MDM: CPT

## 2021-03-20 PROCEDURE — 84702 CHORIONIC GONADOTROPIN TEST: CPT

## 2021-03-20 PROCEDURE — 85025 COMPLETE CBC W/AUTO DIFF WBC: CPT

## 2021-03-20 PROCEDURE — 81003 URINALYSIS AUTO W/O SCOPE: CPT

## 2021-03-20 PROCEDURE — 80048 BASIC METABOLIC PNL TOTAL CA: CPT

## 2021-03-20 ASSESSMENT — ENCOUNTER SYMPTOMS
COUGH: 0
COLOR CHANGE: 0
ABDOMINAL PAIN: 0
CONSTIPATION: 0
VOMITING: 0
EYE REDNESS: 0
EYE DISCHARGE: 0
SHORTNESS OF BREATH: 0
FACIAL SWELLING: 0
DIARRHEA: 0

## 2021-03-20 ASSESSMENT — PAIN DESCRIPTION - LOCATION: LOCATION: PELVIS

## 2021-03-20 NOTE — ED PROVIDER NOTES
[facility] ED  EMERGENCY DEPARTMENT ENCOUNTER      Pt Name: Cheli Larkin  MRN: [id number]  [place] 1980  Date of evaluation: 3/20/2021  Provider: Tiffanie Hill MD    CHIEF COMPLAINT       Chief Complaint   Patient presents with    Pregnancy Problem     5.5 weeks pregnant, cramping and bleeding started 5pm yesterday         HISTORY OF PRESENT ILLNESS  (Location/Symptom, Timing/Onset, Context/Setting, Quality, Duration, Modifying Factors, Severity.)   Cheli Larkin is a 36 y.o. female who presents to the emergency department for vaginal bleeding and pregnancy. She is about 5-1/2 weeks pregnant and had some spotting which started yesterday. It became slightly heavier today. She has some slight left lower quadrant abdominal pain but she states it is not significant. No injury or fever. She rated the pain is a 3 and it feels like a cramp. Nursing Notes were reviewed. ALLERGIES     Pcn [penicillins]    CURRENT MEDICATIONS       Previous Medications    CHOLECALCIFEROL (VITAMIN D3 PO)    Take by mouth daily    CYANOCOBALAMIN (VITAMIN B-12 IJ)    Inject as directed Twice a month    NONFORMULARY    Iron infusion last infusion 2020    PRENATAL VIT-FE FUMARATE-FA (PRENATAL VITAMIN PO)    Take by mouth daily       PAST MEDICAL HISTORY         Diagnosis Date    Anemia        SURGICAL HISTORY           Procedure Laterality Date     SECTION      CHOLECYSTECTOMY           FAMILY HISTORY     History reviewed. No pertinent family history. No family status information on file. SOCIAL HISTORY      reports that she has quit smoking. She has never used smokeless tobacco. She reports previous alcohol use. She reports that she does not use drugs. REVIEW OF SYSTEMS    (2-9 systems for level 4, 10 or more for level 5)     Review of Systems   Constitutional: Negative for chills, fatigue and fever. HENT: Negative for congestion, ear discharge and facial swelling.     Eyes: Negative for discharge and redness. Respiratory: Negative for cough and shortness of breath. Cardiovascular: Negative for chest pain. Gastrointestinal: Negative for abdominal pain, constipation, diarrhea and vomiting. Genitourinary: Positive for vaginal bleeding. Negative for dysuria and hematuria. Musculoskeletal: Negative for arthralgias. Skin: Negative for color change and rash. Neurological: Negative for syncope, numbness and headaches. Hematological: Negative for adenopathy. Psychiatric/Behavioral: Negative for confusion. The patient is not nervous/anxious. Except as noted above the remainder of the review of systems was reviewed and negative. PHYSICAL EXAM    (up to 7 for level 4, 8 or more for level 5)     Vitals:    03/20/21 1018   BP: (!) 145/81   Pulse: 106   Resp: 16   Temp: 98.3 °F (36.8 °C)   TempSrc: Oral   SpO2: 100%   Weight: 245 lb (111.1 kg)   Height: 5' 1\" (1.549 m)       Physical Exam  Vitals signs reviewed. Constitutional:       General: She is not in acute distress. Appearance: She is well-developed. She is not diaphoretic. HENT:      Head: Normocephalic and atraumatic. Eyes:      General: No scleral icterus. Right eye: No discharge. Left eye: No discharge. Neck:      Musculoskeletal: Neck supple. Cardiovascular:      Rate and Rhythm: Normal rate and regular rhythm. Pulmonary:      Effort: Pulmonary effort is normal. No respiratory distress. Breath sounds: Normal breath sounds. No stridor. No wheezing or rales. Abdominal:      General: There is no distension. Palpations: Abdomen is soft. Tenderness: There is no abdominal tenderness. Musculoskeletal: Normal range of motion. Lymphadenopathy:      Cervical: No cervical adenopathy. Skin:     General: Skin is warm and dry. Findings: No erythema or rash. Neurological:      Mental Status: She is alert and oriented to person, place, and time.    Psychiatric:         Behavior: Behavior normal.             DIAGNOSTIC RESULTS     EKG: All EKG's are interpreted by the Emergency Department Physician who either signs or Co-signs this chart in the absence of a cardiologist.    RADIOLOGY:   Non-plain film images such as CT, Ultrasound and MRI are read by the radiologist. Plain radiographic images are visualized and preliminarily interpreted by the emergency physician with the below findings:    Interpretation per the Radiologist below, if available at the time of this note:        LABS:  Labs Reviewed   BASIC METABOLIC PANEL - Abnormal; Notable for the following components:       Result Value    Bun/Cre Ratio 21 (*)     All other components within normal limits   HCG, QUANTITATIVE, PREGNANCY - Abnormal; Notable for the following components:    hCG Quant 71 (*)     All other components within normal limits   CBC WITH AUTO DIFFERENTIAL   ABO/RH       All other labs were within normal range or not returned as of this dictation. EMERGENCY DEPARTMENT COURSE and DIFFERENTIAL DIAGNOSIS/MDM:   Vitals:    Vitals:    03/20/21 1018   BP: (!) 145/81   Pulse: 106   Resp: 16   Temp: 98.3 °F (36.8 °C)   TempSrc: Oral   SpO2: 100%   Weight: 245 lb (111.1 kg)   Height: 5' 1\" (1.549 m)       No orders of the defined types were placed in this encounter. Medical Decision Making: hCG titer is only 71. My clinical impression is that she had a miscarriage and this was discussed with her thoroughly. She will follow-up with her gynecologist back home in [place] and she is returning there tomorrow. Treatment diagnosis and follow-up were discussed with the patient. Blood type a positive. CONSULTS:  None    PROCEDURES:  None    FINAL IMPRESSION      1.  Miscarriage          DISPOSITION/PLAN   DISPOSITION Decision To Discharge 03/20/2021 12:09:51 PM      PATIENT REFERRED TO:   Almaz Bobo, Basilia E Shayan Barros  [address] [facility] [phone]      As needed    [facility]

## 2021-03-22 LAB — HCG, PREGNANCY URINE (POC): POSITIVE
